# Patient Record
Sex: MALE | Race: OTHER | HISPANIC OR LATINO | Employment: FULL TIME | URBAN - METROPOLITAN AREA
[De-identification: names, ages, dates, MRNs, and addresses within clinical notes are randomized per-mention and may not be internally consistent; named-entity substitution may affect disease eponyms.]

---

## 2023-05-08 ENCOUNTER — HOSPITAL ENCOUNTER (EMERGENCY)
Facility: HOSPITAL | Age: 63
Discharge: HOME | End: 2023-05-08
Attending: EMERGENCY MEDICINE

## 2023-05-08 ENCOUNTER — APPOINTMENT (EMERGENCY)
Dept: RADIOLOGY | Facility: HOSPITAL | Age: 63
End: 2023-05-08

## 2023-05-08 VITALS
DIASTOLIC BLOOD PRESSURE: 78 MMHG | HEART RATE: 79 BPM | TEMPERATURE: 97.6 F | SYSTOLIC BLOOD PRESSURE: 157 MMHG | OXYGEN SATURATION: 100 % | RESPIRATION RATE: 18 BRPM

## 2023-05-08 DIAGNOSIS — M77.32 HEEL SPUR, LEFT: ICD-10-CM

## 2023-05-08 DIAGNOSIS — S90.32XA CONTUSION OF LEFT FOOT, INITIAL ENCOUNTER: Primary | ICD-10-CM

## 2023-05-08 NOTE — ED PROVIDER NOTES
History  Chief Complaint   Patient presents with   • Foot Injury     Injured left foot last week due to something fall onto it  Sent by employer  No pain medication pta       62y  o male with no significant PMH presents to the ER for left foot pain after dropping a car part on it at work ([employer])  Patient states the injury happened last week and he got medical attention at work but because his pain continues, he wanted to get it checked  He denies taking any medication for symptoms  He describes his pain as sharp and non-radiating  Pain is constant  He denies fever, chills, URI symptoms, chest pain, dyspnea, N/V/D, abdominal pain, weakness or paresthesias  History provided by:  Patient   used: Yes (Stir)        None       History reviewed  No pertinent past medical history  History reviewed  No pertinent surgical history  History reviewed  No pertinent family history  I have reviewed and agree with the history as documented  E-Cigarette/Vaping   • E-Cigarette Use Never User      E-Cigarette/Vaping Substances     Social History     Tobacco Use   • Smoking status: Never   • Smokeless tobacco: Never   Vaping Use   • Vaping Use: Never used   Substance Use Topics   • Alcohol use: Not Currently   • Drug use: Never       Review of Systems   Constitutional: Negative for activity change, appetite change, chills and fever  HENT: Negative for congestion, drooling, ear discharge, ear pain, facial swelling, rhinorrhea and sore throat  Eyes: Negative for redness  Respiratory: Negative for cough and shortness of breath  Cardiovascular: Negative for chest pain  Gastrointestinal: Negative for abdominal pain, diarrhea, nausea and vomiting  Musculoskeletal: Negative for neck stiffness  Skin: Negative for rash  Allergic/Immunologic: Negative for food allergies  Neurological: Negative for weakness and numbness         Physical Exam  Physical Exam  Vitals and nursing note reviewed  Constitutional:       General: He is not in acute distress  Appearance: He is not toxic-appearing  HENT:      Head: Normocephalic and atraumatic  Eyes:      Conjunctiva/sclera: Conjunctivae normal    Neck:      Trachea: No tracheal deviation  Cardiovascular:      Rate and Rhythm: Normal rate  Pulmonary:      Effort: Pulmonary effort is normal  No respiratory distress  Abdominal:      General: There is no distension  Musculoskeletal:      Cervical back: Normal range of motion and neck supple  Left foot: Normal range of motion  Tenderness and bony tenderness present  No swelling, deformity, laceration or crepitus  Normal pulse  Feet:    Skin:     General: Skin is warm and dry  Findings: No rash  Neurological:      Mental Status: He is alert  GCS: GCS eye subscore is 4  GCS verbal subscore is 5  GCS motor subscore is 6  Psychiatric:         Mood and Affect: Mood normal          Vital Signs  ED Triage Vitals [05/08/23 0942]   Temperature Pulse Respirations Blood Pressure SpO2   97 6 °F (36 4 °C) 79 18 157/78 100 %      Temp src Heart Rate Source Patient Position - Orthostatic VS BP Location FiO2 (%)   -- -- Sitting Right arm --      Pain Score       --           Vitals:    05/08/23 0942   BP: 157/78   Pulse: 79   Patient Position - Orthostatic VS: Sitting         Visual Acuity      ED Medications  Medications - No data to display    Diagnostic Studies  Results Reviewed     None                 XR foot 3+ views LEFT   ED Interpretation by Chanda Renteria PA-C (05/08 1039)   Heel spur seen by me at this time  No acute findings seen by me at this time  Final Result by Kelly Gomes MD (05/08 1621)      No acute osseous abnormality              Workstation performed: [id number]                    Procedures  Procedures         ED Course                               SBIRT 22yo+    Flowsheet Row Most Recent Value   Initial Alcohol Screen: US AUDIT-C 1  How often do you have a drink containing alcohol? 0 Filed at: 05/08/2023 1025   2  How many drinks containing alcohol do you have on a typical day you are drinking? 0 Filed at: 05/08/2023 1025   3a  Male UNDER 65: How often do you have five or more drinks on one occasion? 0 Filed at: 05/08/2023 1025   Audit-C Score 0 Filed at: 05/08/2023 1025   YO: How many times in the past year have you    Used an illegal drug or used a prescription medication for non-medical reasons? Never Filed at: 05/08/2023 1025                    Medical Decision Making  62y  o male presents to the ER for left foot pain since last week after injuring it at work  Vitals are stable  Patient is in no acute distress  On exam, breathing is non-labored  Abdomen is not distended  No erythema, swelling, ecchymosis or deformity of the left foot  Area is tender to palpation  Normal ROM of toes and ankle  Neurovascularly intact  Will check imaging  1035 - Informed patient I did not see any acute abnormalities on xray at this time and if the radiologist saw anything concerning when reading the xray, we would call to inform them  Patient agreeable  Will discharge with outpatient follow up with Podiatry  The management plan was discussed in detail with the patient at bedside and all questions were answered  Prior to discharge, we provided both verbal and written instructions  We discussed with the patient the signs and symptoms for which to return to the emergency department  All questions were answered and patient was comfortable with the plan of care and discharged to home  Instructed the patient to follow up with the primary care provider and/or specialist provided and their written instructions  The patient verbalized understanding of our discussion and plan of care, and agrees to return to the Emergency Department for concerns and progression of illness      At discharge, I instructed the patient to:  -follow up with pcp  -take Tylenol or Motrin for pain  -rest, ice and elevate foot  -follow up with Podiatry  -return to the ER if symptoms worsened or new symptoms arose  Patient agreed to this plan and was stable at time of discharge  Contusion of left foot, initial encounter: acute illness or injury  Heel spur, left: chronic illness or injury  Amount and/or Complexity of Data Reviewed  Independent Historian:      Details: Patient is historian  Radiology: ordered and independent interpretation performed  Disposition  Final diagnoses:   Contusion of left foot, initial encounter   Heel spur, left     Time reflects when diagnosis was documented in both MDM as applicable and the Disposition within this note     Time User Action Codes Description Comment    5/8/2023 10:39 AM Beth Day Leonora Adryleroy Contusion of left foot, initial encounter     5/8/2023 10:39 AM Beth LONG Add [M77 32] Heel spur, left       ED Disposition     ED Disposition   Discharge    Condition   Stable    Date/Time   Mon May 8, 2023 10:39 AM    Comment   Cameron Rodarte discharge to home/self care  Follow-up Information     Follow up With Specialties Details Why Contact Info Additional [address] [facility] Schedule an appointment as soon as possible for a visit  As needed [address], [address] [facility] [address]  [address], [address], [address] [place], [place], [address] Podiatry Schedule an appointment as soon as possible for a visit   [address] [phone]  [address] [place], [place] [address] [address], [address], [place], [place], [address] [phone]          There are no discharge medications for this patient  No discharge procedures on file      PDMP Review     None          ED Provider  Electronically Signed by           Juan Ibarra PA-C  05/08/23 5426

## 2023-05-08 NOTE — DISCHARGE INSTRUCTIONS
DISCHARGE INSTRUCTIONS:    FOLLOW UP WITH YOUR PRIMARY CARE PROVIDER OR THE [address] [place] [address]  MAKE AN APPOINTMENT TO BE SEEN  TAKE TYLENOL OR MOTRIN FOR PAIN  REST, ICE AND ELEVATE FOOT  FOLLOW UP WITH PODIATRY  IF SYMPTOMS WORSEN OR NEW SYMPTOMS ARISE, RETURN TO THE ER TO BE SEEN

## 2023-05-16 ENCOUNTER — APPOINTMENT (EMERGENCY)
Dept: RADIOLOGY | Facility: HOSPITAL | Age: 63
End: 2023-05-16

## 2023-05-16 ENCOUNTER — HOSPITAL ENCOUNTER (EMERGENCY)
Facility: HOSPITAL | Age: 63
Discharge: HOME | End: 2023-05-16
Attending: EMERGENCY MEDICINE

## 2023-05-16 VITALS
OXYGEN SATURATION: 99 % | RESPIRATION RATE: 18 BRPM | WEIGHT: 169 LBS | TEMPERATURE: 98.8 F | DIASTOLIC BLOOD PRESSURE: 88 MMHG | SYSTOLIC BLOOD PRESSURE: 158 MMHG | HEART RATE: 66 BPM

## 2023-05-16 DIAGNOSIS — M79.671 RIGHT FOOT PAIN: ICD-10-CM

## 2023-05-16 DIAGNOSIS — S90.32XA CONTUSION OF LEFT FOOT, INITIAL ENCOUNTER: Primary | ICD-10-CM

## 2023-05-16 RX ORDER — ASPIRIN 81 MG/1
81 TABLET ORAL DAILY
COMMUNITY

## 2023-05-16 RX ORDER — IBUPROFEN 800 MG/1
800 TABLET ORAL 3 TIMES DAILY
Qty: 21 TABLET | Refills: 0 | Status: SHIPPED | OUTPATIENT
Start: 2023-05-16

## 2023-05-16 RX ORDER — IBUPROFEN 200 MG
200-600 TABLET ORAL EVERY 6 HOURS PRN
COMMUNITY

## 2023-05-16 RX ORDER — TAMSULOSIN HYDROCHLORIDE 0.4 MG/1
0.4 CAPSULE ORAL
COMMUNITY
Start: 2022-12-07

## 2023-05-16 RX ORDER — KETOROLAC TROMETHAMINE 30 MG/ML
15 INJECTION, SOLUTION INTRAMUSCULAR; INTRAVENOUS ONCE
Status: COMPLETED | OUTPATIENT
Start: 2023-05-16 | End: 2023-05-16

## 2023-05-16 RX ADMIN — DICLOFENAC SODIUM 2 G: 10 GEL TOPICAL at 20:41

## 2023-05-16 RX ADMIN — KETOROLAC TROMETHAMINE 15 MG: 30 INJECTION, SOLUTION INTRAMUSCULAR; INTRAVENOUS at 20:39

## 2023-05-16 NOTE — Clinical Note
Loren Huerta was seen and treated in our emergency department on 5/16/2023  Diagnosis:     Archana Gonsalez    He may return on this date:     Please excuse this individual on May 17, 2023  If you have any questions or concerns, please don't hesitate to call        Musa Myles PA-C    ______________________________           _______________          _______________  Hospital Representative                              Date                                Time

## 2023-05-16 NOTE — Clinical Note
Marilyn Torres was seen and treated in our emergency department on 5/16/2023  Diagnosis:     Richelle Wilkinson    He may return on this date:     Please excuse this individual on May 17, 2023  If you have any questions or concerns, please don't hesitate to call        Johnny Evans PA-C    ______________________________           _______________          _______________  Hospital Representative                              Date                                Time

## 2023-05-16 NOTE — Clinical Note
Tulio Rod was seen and treated in our emergency department on 5/16/2023  Diagnosis:     Chemo Ramirez    He may return on this date:     Please excuse this individual on May 17, 2023  If you have any questions or concerns, please don't hesitate to call        Va Wilcox PA-C    ______________________________           _______________          _______________  Hospital Representative                              Date                                Time

## 2023-05-17 NOTE — DISCHARGE INSTRUCTIONS
Please return to the emergency department for worsening symptoms including chest pain, shortness of breath, dizziness, lightheadedness, fever greater than 103, severe pain, inability to walk, fainting episodes, etc  Please follow-up with your family practice provider as soon as possible  I have sent medications over to the pharmacy for your symptoms  Please take as directed  Follow-up with a podiatrist as soon as possible  For the pain to the bottom aspect of your right foot, I recommend you freeze a water bottle and roll your foot on it to help alleviate pain and swelling

## 2023-05-17 NOTE — ED PROVIDER NOTES
"History  Chief Complaint   Patient presents with   • Foot Pain     Pt c/o bilateral foot pain, left has hurt since injury last week that he was seen for  Right began hurting today after \"putting a lot of weight on it\"  Pt ambulatory  This is a 67-year-old male with no significant past medical history presenting to the emergency department today for bilateral foot pain  The patient notes that something fell on his left foot and he subsequently came to the emergency department on May 8, 2023 for an evaluation  He had an x-ray at that time that showed no acute osseous abnormalities  Since then, the patient has had continued pain to his left foot  He now notes pain to the plantar aspect of the right foot secondary to \"putting too much weight on\" the right foot  He has pain to the dorsal aspect of the left foot and the plantar aspect of the right foot  He is still able to ambulate  He has no numbness or tingling  He did not follow-up with podiatry as recommended  He is taking 200mg of ibuprofen daily without relief of symptoms  The patient denies other complaints at this time  History provided by:  Patient   used: No    Foot Injury - Major  Lower extremity pain location: bilateral feet  Injury: yes    Chronicity: subacute  Dislocation: no    Foreign body present:  No foreign bodies  Tetanus status:  Unknown  Prior injury to area:  Yes  Relieved by:  Nothing  Worsened by:  Nothing  Ineffective treatments:  NSAIDs  Associated symptoms: no back pain, no decreased ROM, no fatigue, no fever, no itching, no muscle weakness, no neck pain, no numbness, no swelling and no tingling        Prior to Admission Medications   Prescriptions Last Dose Informant Patient Reported? Taking?    FERROUS SULFATE PO   Yes Yes   Sig: Take by mouth   MAGNESIUM PO   Yes Yes   Sig: Take by mouth   Multiple Vitamin (MULTIVITAMIN ADULT PO)   Yes Yes   Sig: Take by mouth   aspirin (ECOTRIN LOW STRENGTH) 81 mg EC " tablet   Yes Yes   Sig: Take 81 mg by mouth daily   ibuprofen (MOTRIN) 200 mg tablet   Yes Yes   Sig: Take 200-600 mg by mouth every 6 (six) hours as needed   tamsulosin (FLOMAX) 0 4 mg   Yes Yes   Sig: Take 0 4 mg by mouth      Facility-Administered Medications: None       History reviewed  No pertinent past medical history  History reviewed  No pertinent surgical history  History reviewed  No pertinent family history  I have reviewed and agree with the history as documented  E-Cigarette/Vaping   • E-Cigarette Use Never User      E-Cigarette/Vaping Substances     Social History     Tobacco Use   • Smoking status: Never   • Smokeless tobacco: Never   Vaping Use   • Vaping Use: Never used   Substance Use Topics   • Alcohol use: Not Currently   • Drug use: Never       Review of Systems   Constitutional: Negative for appetite change, chills, diaphoresis, fatigue and fever  Eyes: Negative for visual disturbance  Respiratory: Negative for cough, chest tightness, shortness of breath and wheezing  Cardiovascular: Negative for chest pain, palpitations and leg swelling  Gastrointestinal: Negative for abdominal pain, constipation, diarrhea, nausea and vomiting  Musculoskeletal: Negative for back pain, neck pain and neck stiffness  Skin: Negative for itching, rash and wound  Neurological: Negative for dizziness, seizures, syncope, weakness, light-headedness, numbness and headaches  Psychiatric/Behavioral: Negative for confusion  All other systems reviewed and are negative  Physical Exam  Physical Exam  Vitals and nursing note reviewed  Constitutional:       General: He is not in acute distress  Appearance: Normal appearance  He is normal weight  He is not ill-appearing, toxic-appearing or diaphoretic  HENT:      Head: Normocephalic and atraumatic  Nose: Nose normal  No congestion or rhinorrhea        Mouth/Throat:      Mouth: Mucous membranes are moist       Pharynx: No oropharyngeal exudate or posterior oropharyngeal erythema  Eyes:      General: No scleral icterus  Right eye: No discharge  Left eye: No discharge  Conjunctiva/sclera: Conjunctivae normal    Cardiovascular:      Rate and Rhythm: Normal rate and regular rhythm  Pulses: Normal pulses  Heart sounds: Normal heart sounds  No murmur heard  No friction rub  No gallop  Comments: 2+ DP pulses bilaterally  Pulmonary:      Effort: Pulmonary effort is normal  No respiratory distress  Breath sounds: Normal breath sounds  No stridor  No wheezing, rhonchi or rales  Chest:      Chest wall: No tenderness  Musculoskeletal:         General: Normal range of motion  Cervical back: Normal range of motion  No rigidity  Right lower leg: No edema  Left lower leg: No edema  Comments: The patient has point tenderness to palpation to the dorsal aspect of the left midfoot; there is no bruising or swelling noted  The patient has tenderness to palpation to the plantar aspect just distal to the right heel to the region of the plantar fascia  The patient is ambulatory in the emergency department without difficulty  The patient has normal range of motion to dorsiflexion, plantarflexion, inversion, and eversion of the bilateral ankles   Skin:     General: Skin is warm and dry  Capillary Refill: Capillary refill takes less than 2 seconds  Coloration: Skin is not jaundiced or pale  Neurological:      General: No focal deficit present  Mental Status: He is alert and oriented to person, place, and time  Mental status is at baseline        Comments: 5 out of 5 strength to the bilateral lower extremities  Normal sensation to the distal lower extremities bilaterally   Psychiatric:         Mood and Affect: Mood normal          Behavior: Behavior normal          Vital Signs  ED Triage Vitals [05/16/23 1956]   Temperature Pulse Respirations Blood Pressure SpO2   98 8 °F (37 1 °C) 66 18 158/88 99 %      Temp Source Heart Rate Source Patient Position - Orthostatic VS BP Location FiO2 (%)   Oral Monitor Sitting Right arm --      Pain Score       --           Vitals:    05/16/23 1956   BP: 158/88   Pulse: 66   Patient Position - Orthostatic VS: Sitting         Visual Acuity      ED Medications  Medications   ketorolac (TORADOL) injection 15 mg (has no administration in time range)   Diclofenac Sodium (VOLTAREN) 1 % topical gel 2 g (has no administration in time range)       Diagnostic Studies  Results Reviewed     None                 XR foot 3+ views RIGHT    (Results Pending)   XR foot 3+ views LEFT    (Results Pending)              Procedures  Procedures         ED Course                               SBIRT 22yo+    Flowsheet Row Most Recent Value   Initial Alcohol Screen: US AUDIT-C     1  How often do you have a drink containing alcohol? 0 Filed at: 05/16/2023 2014   2  How many drinks containing alcohol do you have on a typical day you are drinking? 0 Filed at: 05/16/2023 2014   3a  Male UNDER 65: How often do you have five or more drinks on one occasion? 0 Filed at: 05/16/2023 2014   Audit-C Score 0 Filed at: 05/16/2023 2014   YO: How many times in the past year have you    Used an illegal drug or used a prescription medication for non-medical reasons? Never Filed at: 05/16/2023 2014                    Medical Decision Making  This is a 59-year-old male presenting to the emergency department today for an injury to the left foot and pain to the plantar aspect of the right foot  Was already evaluated for the pain to the left foot but notes it is getting worse  Still able to ambulate  Vitals are stable  On physical examination, the patient has tenderness to palpation to the dorsal left midfoot and to the plantar aspect of the right plantar fascia region  X-ray shows no acute osseous abnormality to the bilateral feet    The patient was dosed with Toradol and Voltaren while here "in the emergency department  The patient is stable for discharge at this time  Ibuprofen sent to the patient's pharmacy  Use Voltaren as needed  Follow-up with podiatry  Exercises given to the patient for suspected plantar fasciitis of the right foot  Strict return precautions were given  Recommend PCP follow-up as soon as possible  The patient and/or patient's proxy verify their understanding and agree to the plan at this time  All questions answered to the patient and/or their proxy's satisfaction  All labs reviewed and utilized in the medical decision making process (if labs were ordered)  Portions of the record may have been created with voice recognition software   Occasional wrong word or \"sound a like\" substitutions may have occurred due to the inherent limitations of voice recognition software   Read the chart carefully and recognize, using context, where substitutions have occurred  Contusion of left foot, initial encounter: complicated acute illness or injury  Right foot pain: complicated acute illness or injury  Amount and/or Complexity of Data Reviewed  Radiology: ordered  Decision-making details documented in ED Course  Details: XR Right Foot  XR Left Foot      Risk  Prescription drug management  Disposition  Final diagnoses:   None     ED Disposition     None      Follow-up Information    None         Patient's Medications   Discharge Prescriptions    No medications on file       No discharge procedures on file      PDMP Review     None          ED Provider  Electronically Signed by           Ian Baptiste PA-C  05/17/23 3592    "